# Patient Record
Sex: MALE | Race: WHITE | NOT HISPANIC OR LATINO | Employment: UNEMPLOYED | URBAN - NONMETROPOLITAN AREA
[De-identification: names, ages, dates, MRNs, and addresses within clinical notes are randomized per-mention and may not be internally consistent; named-entity substitution may affect disease eponyms.]

---
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
## 2025-01-22 ENCOUNTER — TELEPHONE (OUTPATIENT)
Dept: FAMILY MEDICINE CLINIC | Facility: CLINIC | Age: 20
End: 2025-01-22

## 2025-01-22 NOTE — TELEPHONE ENCOUNTER
Called to speak to patient about upcoming appointment and the insurance is also not in the system at this time.     Spoke to patient mother Reva, Emergency Contact & active , that we need his insurance card to scan into the system the day of his appointment. She stated that she will relay the message.

## 2025-07-11 ENCOUNTER — OFFICE VISIT (OUTPATIENT)
Dept: FAMILY MEDICINE CLINIC | Facility: CLINIC | Age: 20
End: 2025-07-11
Payer: COMMERCIAL

## 2025-07-11 VITALS
SYSTOLIC BLOOD PRESSURE: 120 MMHG | BODY MASS INDEX: 22.53 KG/M2 | WEIGHT: 170 LBS | TEMPERATURE: 97.8 F | RESPIRATION RATE: 14 BRPM | OXYGEN SATURATION: 99 % | DIASTOLIC BLOOD PRESSURE: 68 MMHG | HEIGHT: 73 IN | HEART RATE: 89 BPM

## 2025-07-11 DIAGNOSIS — K29.20 CHRONIC ALCOHOLIC GASTRITIS WITHOUT HEMORRHAGE: Primary | ICD-10-CM

## 2025-07-11 DIAGNOSIS — Z00.00 HEALTHCARE MAINTENANCE: ICD-10-CM

## 2025-07-11 DIAGNOSIS — F10.11 ALCOHOL USE DISORDER, MILD, IN EARLY REMISSION, ABUSE: ICD-10-CM

## 2025-07-11 DIAGNOSIS — M79.604 RIGHT LEG PAIN: ICD-10-CM

## 2025-07-11 DIAGNOSIS — F17.200 SMOKER: ICD-10-CM

## 2025-07-11 PROBLEM — F10.19: Status: ACTIVE | Noted: 2025-07-11

## 2025-07-11 LAB — H PYLORI IGG SER IA-ACNC: NEGATIVE

## 2025-07-11 PROCEDURE — 85014 HEMATOCRIT: CPT | Performed by: GENERAL PRACTICE

## 2025-07-11 PROCEDURE — 80050 GENERAL HEALTH PANEL: CPT | Performed by: GENERAL PRACTICE

## 2025-07-11 PROCEDURE — 86677 HELICOBACTER PYLORI ANTIBODY: CPT | Performed by: GENERAL PRACTICE

## 2025-07-11 PROCEDURE — 80061 LIPID PANEL: CPT | Performed by: GENERAL PRACTICE

## 2025-07-11 PROCEDURE — 82747 ASSAY OF FOLIC ACID RBC: CPT | Performed by: GENERAL PRACTICE

## 2025-07-11 PROCEDURE — 82607 VITAMIN B-12: CPT | Performed by: GENERAL PRACTICE

## 2025-07-11 RX ORDER — PANTOPRAZOLE SODIUM 40 MG/1
40 TABLET, DELAYED RELEASE ORAL DAILY
Qty: 30 TABLET | Refills: 2 | Status: SHIPPED | OUTPATIENT
Start: 2025-07-11

## 2025-07-11 RX ORDER — HUMAN PAPILLOMAVIRUS 9-VALENT VACCINE, RECOMBINANT 30; 40; 60; 40; 20; 20; 20; 20; 20 UG/.5ML; UG/.5ML; UG/.5ML; UG/.5ML; UG/.5ML; UG/.5ML; UG/.5ML; UG/.5ML; UG/.5ML
0.5 INJECTION, SUSPENSION INTRAMUSCULAR ONCE
Qty: 0.5 ML | Refills: 2 | Status: SHIPPED | OUTPATIENT
Start: 2025-07-11 | End: 2025-07-11

## 2025-07-11 NOTE — PROGRESS NOTES
Subjective   Hakan Thacker is a 20 y.o. male.     Chief Complaint  This 20-year-old man presents today to establish care    History of Present Illness     Abdominal Pain  He gives a 2 to 3 month history of intermittent abdominal pain.  He describes the pain as an epigastric burning.  The pain does not radiate elsewhere, but has been associated with nausea and vomiting.  The latter has been blood-tinged once.  There is no history of any diarrhea, constipation, hematochezia, or melena, and he denies any chest pain, shortness of breath, cough, hemoptysis, dysuria, hematuria, fever, or chills.  He states that all of the episodes he has had so far have followed days of heavy alcohol consumption, and generally resolve within another day or two.  He states that he has excepted that he has a problem with alcohol and is determined to overcome it.  Apart from antacids, he has not tried anything for his symptoms so far    Right Leg Pain  He gives an approximate 1 year history of intermittent right calf pain.  He describes this as a tight ache.  The pain does not radiate elsewhere, and apart from mild numbness, has been unassociated with any other symptoms.  He denies any weakness, stiffness, or swelling.  The pain is worse when he lies down at night, but can occur on and off throughout the day.  He denies any specific strain or trauma, but the pain began after he started kickboxing training at home.  He denies any previous problems with that leg.    The following portions of the patient's history were reviewed and updated as appropriate: allergies, current medications, past family history, past medical history, past social history, past surgical history, and problem list.    Review of Systems   Constitutional:  Negative for chills, fatigue and fever.   HENT:  Negative for congestion, postnasal drip, rhinorrhea, sinus pressure, sneezing, sore throat and voice change.    Eyes:  Negative for visual disturbance.   Respiratory:   Negative for cough, shortness of breath and wheezing.    Cardiovascular:  Negative for chest pain, palpitations and leg swelling.   Gastrointestinal:  Positive for abdominal pain, nausea and vomiting. Negative for blood in stool, constipation, diarrhea and GERD.   Genitourinary:  Negative for dysuria, frequency, hematuria and nocturia.   Musculoskeletal:  Positive for arthralgias. Negative for back pain, joint swelling, myalgias and neck pain.   Skin:  Negative for rash.   Neurological:  Positive for numbness. Negative for dizziness and weakness.   Hematological:  Does not bruise/bleed easily.   Psychiatric/Behavioral:  Negative for decreased concentration, sleep disturbance and depressed mood. The patient is not nervous/anxious.      Objective   Physical Exam  Constitutional:       General: He is not in acute distress.     Appearance: Normal appearance. He is well-developed. He is not ill-appearing or diaphoretic.      Comments: Accompanied by his mother.  Appeared stated age. Bright and in good spirits. No apparent distress. No pallor, jaundice, diaphoresis, or cyanosis.     HENT:      Head: Atraumatic.      Right Ear: Tympanic membrane, ear canal and external ear normal.      Left Ear: Tympanic membrane, ear canal and external ear normal.      Mouth/Throat:      Lips: No lesions.      Mouth: Mucous membranes are moist. No oral lesions.      Pharynx: No oropharyngeal exudate or posterior oropharyngeal erythema.   Eyes:      General: Lids are normal. Gaze aligned appropriately.      Extraocular Movements: Extraocular movements intact.      Conjunctiva/sclera: Conjunctivae normal.      Pupils: Pupils are equal.   Neck:      Thyroid: No thyroid mass or thyromegaly.      Vascular: No carotid bruit or JVD.      Trachea: Trachea normal. No tracheal deviation.   Cardiovascular:      Rate and Rhythm: Normal rate and regular rhythm.      Heart sounds: Normal heart sounds, S1 normal and S2 normal. No murmur heard.     No  gallop. No S3 or S4 sounds.   Pulmonary:      Effort: Pulmonary effort is normal.      Breath sounds: Normal breath sounds.   Abdominal:      General: Bowel sounds are normal. There is no distension or abdominal bruit.      Palpations: Abdomen is soft. There is no hepatomegaly, splenomegaly or mass.      Tenderness: There is no abdominal tenderness.      Hernia: No hernia is present.   Musculoskeletal:      Lumbar back: Negative right straight leg raise test and negative left straight leg raise test.      Right knee: No swelling. Normal range of motion. No tenderness.      Right lower leg: No tenderness. No edema.      Left lower leg: No edema.   Lymphadenopathy:      Head:      Right side of head: No submental, submandibular, tonsillar, preauricular, posterior auricular or occipital adenopathy.      Left side of head: No submental, submandibular, tonsillar, preauricular, posterior auricular or occipital adenopathy.      Cervical: No cervical adenopathy.      Upper Body:      Right upper body: No supraclavicular adenopathy.      Left upper body: No supraclavicular adenopathy.   Skin:     General: Skin is warm and dry.      Coloration: Skin is not cyanotic, jaundiced or pale.      Findings: No rash.      Nails: There is no clubbing.   Neurological:      Mental Status: He is alert and oriented to person, place, and time.      Cranial Nerves: No cranial nerve deficit, dysarthria or facial asymmetry.      Sensory: No sensory deficit.      Motor: No weakness or tremor.      Coordination: Coordination normal.      Gait: Gait normal.      Deep Tendon Reflexes:      Reflex Scores:       Patellar reflexes are 2+ on the right side and 2+ on the left side.       Achilles reflexes are 1+ on the right side and 1+ on the left side.  Psychiatric:         Attention and Perception: Attention normal.         Mood and Affect: Mood normal.         Speech: Speech normal.         Behavior: Behavior normal.         Thought Content:  Thought content normal.       Assessment & Plan   Problems Addressed this Visit          Gastrointestinal Abdominal     Chronic alcoholic gastritis without hemorrhage  Advised regarding the importance of alcohol cessation  Fasting labs drawn that included H. pylori serology  Will start on pantoprazole and see back in 1 month, sooner if any concerns whatsoever    Relevant Medications    pantoprazole (PROTONIX) 40 MG EC tablet    Other Relevant Orders    CBC & Differential    Comprehensive Metabolic Panel    H. Pylori Antibody, IgG ([facility] and [facility] only)       Health Encounters    Healthcare maintenance  Advised of the potential benefits of HPV vaccination  We will discuss influenza vaccination at his return    Relevant Medications    HPV 9-Valent Recomb Vaccine (Gardasil 9) suspension    Other Relevant Orders    Lipid Panel    TSH    Vitamin B12    Folate RBC       Mental Health    Alcohol use disorder, moderate, in early remission, abuse  As above.  Patient uninterested in any treatment at present, but will consider  Will discuss further at his return    Relevant Medications    pantoprazole (PROTONIX) 40 MG EC tablet    Other Relevant Orders    TSH    Vitamin B12    Folate RBC       Musculoskeletal and Injuries    Right leg pain  Advised to stop kicking for the time being  Plain films of the right tibia/fibula arranged  Will consider an EMG/NCS at his return if he remains symptomatic    Relevant Orders    CBC & Differential    Comprehensive Metabolic Panel    XR tibia fibula 2 vw right       Tobacco    Smoker  Will discuss smoking cessation at his return     Diagnoses         Codes Comments      Chronic alcoholic gastritis without hemorrhage    -  Primary ICD-10-CM: K29.20  ICD-9-CM: 535.30       Healthcare maintenance     ICD-10-CM: Z00.00  ICD-9-CM: V70.0       Alcohol use disorder, mild, in early remission, abuse     ICD-10-CM: F10.11  ICD-9-CM: 305.03       Right leg pain     ICD-10-CM: M79.604  ICD-9-CM: 729.5        Smoker     ICD-10-CM: F17.200  ICD-9-CM: 305.1           I spent 45 minutes caring for Hakan Thacker on this date of service. This time includes time spent by me in the following activities:reviewing tests, performing a medically appropriate examination and/or evaluation , counseling and educating the patient/family/caregiver, ordering medications, tests, or procedures and documenting information in the medical record

## 2025-07-12 LAB
ALBUMIN SERPL-MCNC: 4.4 G/DL (ref 3.5–5.2)
ALBUMIN/GLOB SERPL: 1.7 G/DL
ALP SERPL-CCNC: 92 U/L (ref 39–117)
ALT SERPL W P-5'-P-CCNC: 26 U/L (ref 1–41)
ANION GAP SERPL CALCULATED.3IONS-SCNC: 9 MMOL/L (ref 5–15)
AST SERPL-CCNC: 29 U/L (ref 1–40)
BASOPHILS # BLD AUTO: 0.03 10*3/MM3 (ref 0–0.2)
BASOPHILS NFR BLD AUTO: 0.6 % (ref 0–1.5)
BILIRUB SERPL-MCNC: 0.2 MG/DL (ref 0–1.2)
BUN SERPL-MCNC: 9 MG/DL (ref 6–20)
BUN/CREAT SERPL: 12.3 (ref 7–25)
CALCIUM SPEC-SCNC: 9.4 MG/DL (ref 8.6–10.5)
CHLORIDE SERPL-SCNC: 102 MMOL/L (ref 98–107)
CHOLEST SERPL-MCNC: 119 MG/DL (ref 0–200)
CO2 SERPL-SCNC: 26 MMOL/L (ref 22–29)
CREAT SERPL-MCNC: 0.73 MG/DL (ref 0.76–1.27)
DEPRECATED RDW RBC AUTO: 39.6 FL (ref 37–54)
EGFRCR SERPLBLD CKD-EPI 2021: 132.7 ML/MIN/1.73
EOSINOPHIL # BLD AUTO: 0.14 10*3/MM3 (ref 0–0.4)
EOSINOPHIL NFR BLD AUTO: 2.9 % (ref 0.3–6.2)
ERYTHROCYTE [DISTWIDTH] IN BLOOD BY AUTOMATED COUNT: 12.1 % (ref 12.3–15.4)
GLOBULIN UR ELPH-MCNC: 2.6 GM/DL
GLUCOSE SERPL-MCNC: 96 MG/DL (ref 65–99)
HCT VFR BLD AUTO: 41.6 % (ref 37.5–51)
HDLC SERPL-MCNC: 73 MG/DL (ref 40–60)
HGB BLD-MCNC: 14 G/DL (ref 13–17.7)
IMM GRANULOCYTES # BLD AUTO: 0.02 10*3/MM3 (ref 0–0.05)
IMM GRANULOCYTES NFR BLD AUTO: 0.4 % (ref 0–0.5)
LDLC SERPL CALC-MCNC: 35 MG/DL (ref 0–100)
LDLC/HDLC SERPL: 0.5 {RATIO}
LYMPHOCYTES # BLD AUTO: 1.08 10*3/MM3 (ref 0.7–3.1)
LYMPHOCYTES NFR BLD AUTO: 22 % (ref 19.6–45.3)
MCH RBC QN AUTO: 30.4 PG (ref 26.6–33)
MCHC RBC AUTO-ENTMCNC: 33.7 G/DL (ref 31.5–35.7)
MCV RBC AUTO: 90.4 FL (ref 79–97)
MONOCYTES # BLD AUTO: 0.35 10*3/MM3 (ref 0.1–0.9)
MONOCYTES NFR BLD AUTO: 7.1 % (ref 5–12)
NEUTROPHILS NFR BLD AUTO: 3.29 10*3/MM3 (ref 1.7–7)
NEUTROPHILS NFR BLD AUTO: 67 % (ref 42.7–76)
NRBC BLD AUTO-RTO: 0 /100 WBC (ref 0–0.2)
PLATELET # BLD AUTO: 178 10*3/MM3 (ref 140–450)
PMV BLD AUTO: 12.5 FL (ref 6–12)
POTASSIUM SERPL-SCNC: 4.8 MMOL/L (ref 3.5–5.2)
PROT SERPL-MCNC: 7 G/DL (ref 6–8.5)
RBC # BLD AUTO: 4.6 10*6/MM3 (ref 4.14–5.8)
SODIUM SERPL-SCNC: 137 MMOL/L (ref 136–145)
TRIGL SERPL-MCNC: 47 MG/DL (ref 0–150)
TSH SERPL DL<=0.05 MIU/L-ACNC: 0.57 UIU/ML (ref 0.27–4.2)
VIT B12 BLD-MCNC: 421 PG/ML (ref 211–946)
VLDLC SERPL-MCNC: 11 MG/DL (ref 5–40)
WBC NRBC COR # BLD AUTO: 4.91 10*3/MM3 (ref 3.4–10.8)

## 2025-07-14 LAB
FOLATE BLD-MCNC: 434 NG/ML
FOLATE RBC-MCNC: 962 NG/ML
HCT VFR BLD AUTO: 45.1 % (ref 37.5–51)